# Patient Record
Sex: FEMALE | Race: WHITE | HISPANIC OR LATINO | Employment: UNEMPLOYED | URBAN - METROPOLITAN AREA
[De-identification: names, ages, dates, MRNs, and addresses within clinical notes are randomized per-mention and may not be internally consistent; named-entity substitution may affect disease eponyms.]

---
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
## 2020-12-22 NOTE — CARE PLAN
Problem: Infection  Goal: Will remain free from infection  Outcome: PROGRESSING AS EXPECTED  Patient afebrile, no s/s infection.       Excision Depth: adipose tissue

## 2022-01-18 ENCOUNTER — APPOINTMENT (OUTPATIENT)
Dept: RADIOLOGY | Facility: MEDICAL CENTER | Age: 22
End: 2022-01-18
Attending: EMERGENCY MEDICINE
Payer: MEDICAID

## 2022-01-18 ENCOUNTER — HOSPITAL ENCOUNTER (EMERGENCY)
Facility: MEDICAL CENTER | Age: 22
End: 2022-01-18
Attending: EMERGENCY MEDICINE
Payer: MEDICAID

## 2022-01-18 VITALS
HEART RATE: 74 BPM | DIASTOLIC BLOOD PRESSURE: 55 MMHG | TEMPERATURE: 98.3 F | SYSTOLIC BLOOD PRESSURE: 134 MMHG | OXYGEN SATURATION: 96 % | WEIGHT: 200 LBS | HEIGHT: 64 IN | BODY MASS INDEX: 34.15 KG/M2 | RESPIRATION RATE: 16 BRPM

## 2022-01-18 DIAGNOSIS — S70.11XA CONTUSION OF RIGHT THIGH, INITIAL ENCOUNTER: ICD-10-CM
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
## 2022-01-18 DIAGNOSIS — S20.219A CONTUSION OF CHEST WALL, UNSPECIFIED LATERALITY, INITIAL ENCOUNTER: ICD-10-CM

## 2022-01-18 LAB
ABO GROUP BLD: NORMAL
ALBUMIN SERPL BCP-MCNC: 4.1 G/DL (ref 3.2–4.9)
ALBUMIN/GLOB SERPL: 1.6 G/DL
ALP SERPL-CCNC: 71 U/L (ref 30–99)
ALT SERPL-CCNC: 37 U/L (ref 2–50)
ANION GAP SERPL CALC-SCNC: 12 MMOL/L (ref 7–16)
APTT PPP: 22.1 SEC (ref 24.7–36)
AST SERPL-CCNC: 35 U/L (ref 12–45)
BILIRUB SERPL-MCNC: 0.2 MG/DL (ref 0.1–1.5)
BLD GP AB SCN SERPL QL: NORMAL
BUN SERPL-MCNC: 13 MG/DL (ref 8–22)
CALCIUM SERPL-MCNC: 9.5 MG/DL (ref 8.5–10.5)
CHLORIDE SERPL-SCNC: 101 MMOL/L (ref 96–112)
CO2 SERPL-SCNC: 19 MMOL/L (ref 20–33)
CREAT SERPL-MCNC: 0.8 MG/DL (ref 0.5–1.4)
ERYTHROCYTE [DISTWIDTH] IN BLOOD BY AUTOMATED COUNT: 48 FL (ref 35.9–50)
ETHANOL BLD-MCNC: <10.1 MG/DL (ref 0–10)
GLOBULIN SER CALC-MCNC: 2.6 G/DL (ref 1.9–3.5)
GLUCOSE SERPL-MCNC: 92 MG/DL (ref 65–99)
HCG SERPL QL: NEGATIVE
HCT VFR BLD AUTO: 41.8 % (ref 37–47)
HGB BLD-MCNC: 14.7 G/DL (ref 12–16)
INR PPP: 0.88 (ref 0.87–1.13)
MCH RBC QN AUTO: 33.3 PG (ref 27–33)
MCHC RBC AUTO-ENTMCNC: 35.2 G/DL (ref 33.6–35)
MCV RBC AUTO: 94.6 FL (ref 81.4–97.8)
PLATELET # BLD AUTO: 393 K/UL (ref 164–446)
PMV BLD AUTO: 8.7 FL (ref 9–12.9)
POTASSIUM SERPL-SCNC: 4.1 MMOL/L (ref 3.6–5.5)
PROT SERPL-MCNC: 6.7 G/DL (ref 6–8.2)
PROTHROMBIN TIME: 11.7 SEC (ref 12–14.6)
RBC # BLD AUTO: 4.42 M/UL (ref 4.2–5.4)
RH BLD: NORMAL
SODIUM SERPL-SCNC: 132 MMOL/L (ref 135–145)
WBC # BLD AUTO: 15.3 K/UL (ref 4.8–10.8)

## 2022-01-18 PROCEDURE — 71260 CT THORAX DX C+: CPT

## 2022-01-18 PROCEDURE — 82077 ASSAY SPEC XCP UR&BREATH IA: CPT

## 2022-01-18 PROCEDURE — 305948 HCHG GREEN TRAUMA ACT PRE-NOTIFY NO CC

## 2022-01-18 PROCEDURE — 72170 X-RAY EXAM OF PELVIS: CPT

## 2022-01-18 PROCEDURE — 85027 COMPLETE CBC AUTOMATED: CPT

## 2022-01-18 PROCEDURE — 86901 BLOOD TYPING SEROLOGIC RH(D): CPT

## 2022-01-18 PROCEDURE — 86850 RBC ANTIBODY SCREEN: CPT

## 2022-01-18 PROCEDURE — 72131 CT LUMBAR SPINE W/O DYE: CPT

## 2022-01-18 PROCEDURE — 96374 THER/PROPH/DIAG INJ IV PUSH: CPT

## 2022-01-18 PROCEDURE — 700117 HCHG RX CONTRAST REV CODE 255: Performed by: EMERGENCY MEDICINE

## 2022-01-18 PROCEDURE — 700111 HCHG RX REV CODE 636 W/ 250 OVERRIDE (IP): Performed by: EMERGENCY MEDICINE

## 2022-01-18 PROCEDURE — 70450 CT HEAD/BRAIN W/O DYE: CPT

## 2022-01-18 PROCEDURE — 84703 CHORIONIC GONADOTROPIN ASSAY: CPT

## 2022-01-18 PROCEDURE — 99285 EMERGENCY DEPT VISIT HI MDM: CPT

## 2022-01-18 PROCEDURE — 86900 BLOOD TYPING SEROLOGIC ABO: CPT

## 2022-01-18 PROCEDURE — 85730 THROMBOPLASTIN TIME PARTIAL: CPT

## 2022-01-18 PROCEDURE — 71045 X-RAY EXAM CHEST 1 VIEW: CPT

## 2022-01-18 PROCEDURE — 85610 PROTHROMBIN TIME: CPT

## 2022-01-18 PROCEDURE — 72125 CT NECK SPINE W/O DYE: CPT

## 2022-01-18 PROCEDURE — 72128 CT CHEST SPINE W/O DYE: CPT

## 2022-01-18 PROCEDURE — 80053 COMPREHEN METABOLIC PANEL: CPT

## 2022-01-18 RX ADMIN — IOHEXOL 100 ML: 350 INJECTION, SOLUTION INTRAVENOUS at 19:26

## 2022-01-18 RX ADMIN — FENTANYL CITRATE 50 MCG: 50 INJECTION, SOLUTION INTRAMUSCULAR; INTRAVENOUS at 18:56

## 2022-01-18 ASSESSMENT — PAIN DESCRIPTION - PAIN TYPE: TYPE: ACUTE PAIN

## 2022-01-18 ASSESSMENT — FIBROSIS 4 INDEX: FIB4 SCORE: 0.34
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
## 2024-04-26 NOTE — PROGRESS NOTES
"ANTEPARTUM PROGRESS NOTE;    Jojo Gee is a 17 y.o. female  at 36w0d by [id number] with DEONTE 10/11/17 (correction from earlier notes after re-review of clinic EMR) who is hospitalized for hypertension and headaches without preeclampsia.  She has also transiently endorsed SI and required legal hold and  sitter for a few days. Evaluated by psychiatry 17, legal hold released, and placed on risperidone for mild psychotic symptoms.     This morning had a couple of elevated BPs but they resolved back into the normal range. CBC done which shows no changes. Repeat prot:cr ratio pending. She denies any headache, blurred vision, SOB, n/v, abdominal pain. No ctx. +FM. No VB    Patient Active Problem List    Diagnosis Date Noted   • Hypertension affecting pregnancy 2017       Review of systems; denies vaginal bleeding, leakage of fluid, uterine contractions, fever chills or abdominal pain  Past Medical History:   Diagnosis Date   • Asthma    • Psychiatric problem     history of depression, schizoaffective disorder, psychosis     Past Surgical History:   Procedure Laterality Date   • OTHER      possible MRSA infection     Review of patient's allergies indicates no known allergies.  Social History     Social History   • Marital status: Single     Spouse name: N/A   • Number of children: N/A   • Years of education: N/A     Occupational History   • Not on file.     Social History Main Topics   • Smoking status: Not on file   • Smokeless tobacco: Not on file   • Alcohol use Not on file   • Drug use: Unknown   • Sexual activity: Not on file     Other Topics Concern   • Not on file     Social History Narrative   • No narrative on file         Physical examination;  Alert and oriented x3  Gen.-well-developed well-nourished female in no apparent distress  HEENT-normocephalic, nontraumatic,EOMI,PERRLA  /60   Pulse 85   Temp 36.6 °C (97.8 °F)   Resp 20   Ht 1.549 m (5' 1\")   Wt (!) 136.5 kg (301 lb)   BMI " 56.87 kg/m²   Skin is warm and dry  Back-negative for CVA tenderness  Cardiovascular-regular rate and rhythm, normal S1-S2 no murmurs gallops  Lungs-clear to stitch bilaterally  Abdomen-nondistended positive bowel sounds soft nontender without masses or hepatosplenomegaly  Cervix- n/a  Extremities without cyanosis clubbing or edema  Neurologic grossly intact    Labs;      NST-reactive NST without contractions    Impression;  Jojo Gee is a 17 y.o. female  at 35w5d by [id number] with DEONTE 10/11/17 (correction from earlier notes after re-review of clinic EMR) who is hospitalized for hypertension and headaches without preeclampsia.      # Gestational Hypertension  - BP improving while in hospital, not requiring antihypertensive medication. Likely due to reduced stress, good nutrition, rest  - continue q4h vital signs. BPs in good range, highest 141/63 yesterday  - 24hr urine on admission - 220.8mg  - Spot prot:creatinine ratio 139 9/8 stable, continue q3d check,  - Does not currently have preeclampsia, however, does appear to have gestational hypertension which is responding to rest and good nutrition  - GBS +  - syphilis and gonorrhea neg                        -NST q shift                        -Serial BP                        -No hemabate or methergine for pph                        -Plan to repeat prot/cr ratio q3d per Dr. Braun                                              - Ambulate in hallways tid   #Poor social Situation   - Patient does not have access to transportation, lives in [facility]  - Misses appointments due to this  - Clearly states she would not be able to make frequent appointments or get regular lab work.  - at last appointment in [place], was hypertensive 150/98 and tr protein on urine dipstick; labs were ordered but she was unable to carry out further lab studies and could not get to appointment with [facility]  - if she were to acutely decompensate, she is far from tertiary care and a poor  outcome for both her and baby is a real possibility.     # Depression  # SI  # History of self harm  # History of schizoaffective disorder   - Per nursing patient reported recent SI on admission  - No SI at present                        - Continue zoloft                        - Monitor closely                         - Psychiatry consultation--appreciate their recommendations; started on risperidone to control psychotic symptoms      # Abscess inner thigh  - healed  - received full week of clindamycin for GBS, peptostreptococcus in culture     Dispo: Antepartum, extremely poor social situation, unsafe discharge due to lack of transportation and ongoing mental health issues, plan to continue inpatient until 37 wks per Dr. Braun. Will induce at 37 wks or deliver earlier if her condition deteriorates.             none